# Patient Record
Sex: FEMALE | Race: ASIAN | NOT HISPANIC OR LATINO
[De-identification: names, ages, dates, MRNs, and addresses within clinical notes are randomized per-mention and may not be internally consistent; named-entity substitution may affect disease eponyms.]

---

## 2022-11-15 ENCOUNTER — APPOINTMENT (OUTPATIENT)
Dept: OBGYN | Facility: CLINIC | Age: 27
End: 2022-11-15

## 2022-11-15 ENCOUNTER — NON-APPOINTMENT (OUTPATIENT)
Age: 27
End: 2022-11-15

## 2022-11-15 VITALS
BODY MASS INDEX: 20.09 KG/M2 | DIASTOLIC BLOOD PRESSURE: 70 MMHG | SYSTOLIC BLOOD PRESSURE: 108 MMHG | HEIGHT: 66 IN | WEIGHT: 125 LBS

## 2022-11-15 DIAGNOSIS — Z86.2 PERSONAL HISTORY OF DISEASES OF THE BLOOD AND BLOOD-FORMING ORGANS AND CERTAIN DISORDERS INVOLVING THE IMMUNE MECHANISM: ICD-10-CM

## 2022-11-15 DIAGNOSIS — Z11.3 ENCOUNTER FOR SCREENING FOR INFECTIONS WITH A PREDOMINANTLY SEXUAL MODE OF TRANSMISSION: ICD-10-CM

## 2022-11-15 DIAGNOSIS — Z78.9 OTHER SPECIFIED HEALTH STATUS: ICD-10-CM

## 2022-11-15 DIAGNOSIS — N89.8 OTHER SPECIFIED NONINFLAMMATORY DISORDERS OF VAGINA: ICD-10-CM

## 2022-11-15 DIAGNOSIS — Z01.419 ENCOUNTER FOR GYNECOLOGICAL EXAMINATION (GENERAL) (ROUTINE) W/OUT ABNORMAL FINDINGS: ICD-10-CM

## 2022-11-15 DIAGNOSIS — Z82.49 FAMILY HISTORY OF ISCHEMIC HEART DISEASE AND OTHER DISEASES OF THE CIRCULATORY SYSTEM: ICD-10-CM

## 2022-11-15 PROBLEM — Z00.00 ENCOUNTER FOR PREVENTIVE HEALTH EXAMINATION: Status: ACTIVE | Noted: 2022-11-15

## 2022-11-15 PROCEDURE — 99385 PREV VISIT NEW AGE 18-39: CPT

## 2022-11-15 NOTE — HISTORY OF PRESENT ILLNESS
[Y] : Patient is sexually active [N] : Patient denies prior pregnancies [Normal Amount/Duration] :  normal amount and duration [Regular Cycle Intervals] : periods have been regular [Previously active] : previously active [LMPDate] : 11/01/2022 [MensesFreq] : 28 [MensesLength] : 6 [FreeTextEntry1] : 11/1/22

## 2022-11-15 NOTE — PHYSICAL EXAM
[Chaperone Present] : A chaperone was present in the examining room during all aspects of the physical examination [Appropriately responsive] : appropriately responsive [Alert] : alert [No Acute Distress] : no acute distress [No Lymphadenopathy] : no lymphadenopathy [Regular Rate Rhythm] : regular rate rhythm [Clear to Auscultation B/L] : clear to auscultation bilaterally [Soft] : soft [Non-tender] : non-tender [Non-distended] : non-distended [No Lesions] : no lesions [No Mass] : no mass [Oriented x3] : oriented x3 [Examination Of The Breasts] : a normal appearance [No Masses] : no breast masses were palpable [Labia Majora] : normal [Labia Minora] : normal [Normal] : normal [Normal Position] : in a normal position [Uterine Adnexae] : normal

## 2022-11-15 NOTE — PLAN
[FreeTextEntry1] : annual: pap and gc/ct\par \par  not currently s/a, declines birth control\par has had vaginal odor / worried about increased discharge at times\par vaginitis swab sent: nothing obvious on exam\par \par d/w she may want to try "the killer" boric acid suppositories if notices above sx but of course willl treat as needed

## 2022-11-28 LAB
C TRACH RRNA SPEC QL NAA+PROBE: NOT DETECTED
CANDIDA VAG CYTO: DETECTED
CYTOLOGY CVX/VAG DOC THIN PREP: ABNORMAL
G VAGINALIS+PREV SP MTYP VAG QL MICRO: NOT DETECTED
N GONORRHOEA RRNA SPEC QL NAA+PROBE: NOT DETECTED
SOURCE TP AMPLIFICATION: NORMAL
T VAGINALIS VAG QL WET PREP: NOT DETECTED

## 2022-11-28 RX ORDER — FLUCONAZOLE 150 MG/1
150 TABLET ORAL DAILY
Qty: 1 | Refills: 5 | Status: ACTIVE | COMMUNITY
Start: 2022-11-28 | End: 1900-01-01

## 2025-02-07 ENCOUNTER — OFFICE (OUTPATIENT)
Dept: URBAN - METROPOLITAN AREA CLINIC 92 | Facility: CLINIC | Age: 30
Setting detail: OPHTHALMOLOGY
End: 2025-02-07
Payer: COMMERCIAL

## 2025-02-07 DIAGNOSIS — H01.001: ICD-10-CM

## 2025-02-07 DIAGNOSIS — H01.005: ICD-10-CM

## 2025-02-07 DIAGNOSIS — H01.004: ICD-10-CM

## 2025-02-07 DIAGNOSIS — H01.002: ICD-10-CM

## 2025-02-07 DIAGNOSIS — H00.11: ICD-10-CM

## 2025-02-07 PROCEDURE — 92015 DETERMINE REFRACTIVE STATE: CPT | Performed by: OPHTHALMOLOGY

## 2025-02-07 PROCEDURE — 99203 OFFICE O/P NEW LOW 30 MIN: CPT | Performed by: OPHTHALMOLOGY

## 2025-02-07 ASSESSMENT — REFRACTION_CURRENTRX
OS_CYLINDER: -0.50
OS_OVR_VA: 20/
OD_SPHERE: -0.75
OD_OVR_VA: 20/
OS_SPHERE: -0.75
OD_VPRISM_DIRECTION: SV
OS_VPRISM_DIRECTION: SV
OD_CYLINDER: -1.25
OS_AXIS: 011
OD_AXIS: 003

## 2025-02-07 ASSESSMENT — VISUAL ACUITY
OD_BCVA: 20/30+
OS_BCVA: 20/30-2

## 2025-02-07 ASSESSMENT — LID EXAM ASSESSMENTS
OS_BLEPHARITIS: LLL LUL 2+
OD_BLEPHARITIS: RLL RUL 1+

## 2025-02-10 ENCOUNTER — OFFICE (OUTPATIENT)
Dept: URBAN - METROPOLITAN AREA CLINIC 92 | Facility: CLINIC | Age: 30
Setting detail: OPHTHALMOLOGY
End: 2025-02-10
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
## 2025-02-10 PROCEDURE — NO SHOW FE NO SHOW FEE: Performed by: OPHTHALMOLOGY

## 2025-06-25 ENCOUNTER — APPOINTMENT (OUTPATIENT)
Dept: HUMAN REPRODUCTION | Facility: CLINIC | Age: 30
End: 2025-06-25

## 2025-06-25 PROCEDURE — 36415 COLL VENOUS BLD VENIPUNCTURE: CPT

## 2025-06-25 PROCEDURE — 76830 TRANSVAGINAL US NON-OB: CPT

## 2025-06-25 PROCEDURE — 99204 OFFICE O/P NEW MOD 45 MIN: CPT | Mod: 25

## 2025-07-01 ENCOUNTER — APPOINTMENT (OUTPATIENT)
Dept: HUMAN REPRODUCTION | Facility: CLINIC | Age: 30
End: 2025-07-01
Payer: COMMERCIAL

## 2025-07-01 ENCOUNTER — TRANSCRIPTION ENCOUNTER (OUTPATIENT)
Age: 30
End: 2025-07-01

## 2025-07-01 PROCEDURE — 99213 OFFICE O/P EST LOW 20 MIN: CPT | Mod: 95

## 2025-07-16 ENCOUNTER — APPOINTMENT (OUTPATIENT)
Dept: MRI IMAGING | Facility: CLINIC | Age: 30
End: 2025-07-16

## 2025-07-16 ENCOUNTER — OUTPATIENT (OUTPATIENT)
Dept: OUTPATIENT SERVICES | Facility: HOSPITAL | Age: 30
LOS: 1 days | End: 2025-07-16

## 2025-07-16 PROCEDURE — 72197 MRI PELVIS W/O & W/DYE: CPT | Mod: 26,52

## 2025-07-30 ENCOUNTER — APPOINTMENT (OUTPATIENT)
Dept: HUMAN REPRODUCTION | Facility: CLINIC | Age: 30
End: 2025-07-30
Payer: COMMERCIAL

## 2025-07-30 PROCEDURE — 36415 COLL VENOUS BLD VENIPUNCTURE: CPT

## 2025-07-30 PROCEDURE — 76857 US EXAM PELVIC LIMITED: CPT

## 2025-07-30 PROCEDURE — 99213 OFFICE O/P EST LOW 20 MIN: CPT | Mod: 25

## 2025-07-30 PROCEDURE — 84144 ASSAY OF PROGESTERONE: CPT

## 2025-07-30 PROCEDURE — 83002 ASSAY OF GONADOTROPIN (LH): CPT | Mod: QW

## 2025-07-30 PROCEDURE — 82670 ASSAY OF TOTAL ESTRADIOL: CPT

## 2025-08-01 ENCOUNTER — APPOINTMENT (OUTPATIENT)
Dept: HUMAN REPRODUCTION | Facility: CLINIC | Age: 30
End: 2025-08-01

## 2025-08-01 PROCEDURE — 36415 COLL VENOUS BLD VENIPUNCTURE: CPT

## 2025-08-01 PROCEDURE — 99213 OFFICE O/P EST LOW 20 MIN: CPT | Mod: 25

## 2025-08-01 PROCEDURE — 76857 US EXAM PELVIC LIMITED: CPT

## 2025-08-04 ENCOUNTER — APPOINTMENT (OUTPATIENT)
Dept: HUMAN REPRODUCTION | Facility: CLINIC | Age: 30
End: 2025-08-04

## 2025-08-04 PROCEDURE — 76857 US EXAM PELVIC LIMITED: CPT

## 2025-08-04 PROCEDURE — 36415 COLL VENOUS BLD VENIPUNCTURE: CPT

## 2025-08-04 PROCEDURE — 99213 OFFICE O/P EST LOW 20 MIN: CPT | Mod: 25

## 2025-08-06 ENCOUNTER — APPOINTMENT (OUTPATIENT)
Dept: HUMAN REPRODUCTION | Facility: CLINIC | Age: 30
End: 2025-08-06

## 2025-08-06 PROCEDURE — 99213 OFFICE O/P EST LOW 20 MIN: CPT | Mod: 25

## 2025-08-06 PROCEDURE — 36415 COLL VENOUS BLD VENIPUNCTURE: CPT

## 2025-08-06 PROCEDURE — 76857 US EXAM PELVIC LIMITED: CPT

## 2025-08-07 ENCOUNTER — APPOINTMENT (OUTPATIENT)
Dept: HUMAN REPRODUCTION | Facility: CLINIC | Age: 30
End: 2025-08-07

## 2025-08-07 PROCEDURE — 36415 COLL VENOUS BLD VENIPUNCTURE: CPT

## 2025-08-07 PROCEDURE — 99213 OFFICE O/P EST LOW 20 MIN: CPT | Mod: 25

## 2025-08-07 PROCEDURE — 76857 US EXAM PELVIC LIMITED: CPT

## 2025-08-08 ENCOUNTER — APPOINTMENT (OUTPATIENT)
Dept: HUMAN REPRODUCTION | Facility: CLINIC | Age: 30
End: 2025-08-08

## 2025-08-08 PROCEDURE — 99213 OFFICE O/P EST LOW 20 MIN: CPT | Mod: 25

## 2025-08-08 PROCEDURE — 36415 COLL VENOUS BLD VENIPUNCTURE: CPT

## 2025-08-08 PROCEDURE — 76857 US EXAM PELVIC LIMITED: CPT

## 2025-08-26 ENCOUNTER — APPOINTMENT (OUTPATIENT)
Dept: HUMAN REPRODUCTION | Facility: CLINIC | Age: 30
End: 2025-08-26
Payer: COMMERCIAL

## 2025-08-26 PROCEDURE — 99213 OFFICE O/P EST LOW 20 MIN: CPT | Mod: 95
